# Patient Record
(demographics unavailable — no encounter records)

---

## 2025-02-05 NOTE — HISTORY OF PRESENT ILLNESS
[de-identified] : Nicolle Tom is a 60 year old female referred by Dr Wilder for colon cancer  Oncologic history: 1.  colon cancer (cecum) --presentation:  admitted to [facility] with severe iron deficiency anemia --EGD + colonoscopy (Dr Branham):  H pylori gastritis.  Malignant appearing mass at the ileocecal valve.  Biopsy: at least intramucosal carcinoma.  5 mm sigmoid polyp (pathology hyperplastic polyp). --pre-op CT c/a/p 24:  chest clear.  indeterminate liver lesions. cecal mass --pre-op MRI abdomen 24:  cysts in liver. --pre-op CEA = 2.2 --laparoscopic R colectomy 24.  Pathology:  grade 2 adenocarcinoma of the cecum.  + PNI.  No LVI, no perforation.  intermediate tumor budding score.  Margins negative.  0/19 lymph nodes involved by cancer.  MMR intact by IHC.  [id number] (stage IIA) --ctDNA negative.  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  SH: no drug use, nonsmoker.  lives in [place].  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  [de-identified] : Here for follow up. Up to 3 BM per day. Regular brown bowel movements, no hematochezia. 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 otherwise she feels good. denies SOB, fever, nausea, vomiting, constipation, diarrhea, or abdominal pain. Has FMLA paperwork

## 2025-02-05 NOTE — ASSESSMENT
[FreeTextEntry1] : Nicolle Tom is a 60 year old female who presented to [facility] in 4/2024 with severe iron deficiency anemia.  Underwent EGD + colonoscopy (Dr Branham) that showed H. pylori gastritis and a malignant appearing mass at the ileocecal valve.  Biopsy of the colon mass showed at least intramucosal carcinoma.  Pre-op CT c/a/p showed indeterminate liver lesions and the aforementioned cecal mass.  MRI of the abdomen was performed and reported that the lesions in the liver were cysts.  She underwent a laparoscopic R colectomy on 4/9/24.  Pathology showed a grade 2 adenocarcinoma of the cecum.  + PNI.  No LVI, no perforation.  intermediate tumor budding score.  Margins negative.  0/19 lymph nodes involved by cancer.  MMR intact by IHC.  [id number] (stage IIA).  Circulating tumor DNA testing negative post-op; no adjuvant therapy. Currently on surveillance.  Plan: #.  colon (cecum) cancer --diagnosis, imaging and pathology results reviewed w/ the patient and her family at the initial postoperative visit.  Prognosis was also discussed.  This is a potentially curable cancer. --She had a stage IIA colon cancer with good lymph node sampling.  Only adverse risk factor for recurrence was the presence of perineural invasion.  Since CT DNA testing was negative, we deferred adjuvant chemotherapy and she entered observation. --doing well at this visit, no concerning findings by history or examination --due for labs:  CBC, CMP, CEA today   --discussed surveillance:  colonoscopy at 1 year (4/2025);  CT scans annually (due Spring 2025);  H&P + labs every 3-6 months.  Will refer to GI for colonoscopy in Spring.  # REJI --Hgb 12.4 in July 2024, Iron sat 16% and ferritin 29 --Intermittently using PO iron, on daily MV  #  History of h pylori gastritis --after initial visit was able to confirm that she was treated for H pylori after this was diagnosed.  obtained H pylori breath test, negative.   RTC in April/May 2025 after scans, colonoscopy - CBC, CMP, CEA  please see attending phyisican addendum below

## 2025-02-05 NOTE — ASSESSMENT
[FreeTextEntry1] : Nicolle Tom is a 60 year old female who presented to [facility] in 4/2024 with severe iron deficiency anemia.  Underwent EGD + colonoscopy (Dr Branham) that showed H. pylori gastritis and a malignant appearing mass at the ileocecal valve.  Biopsy of the colon mass showed at least intramucosal carcinoma.  Pre-op CT c/a/p showed indeterminate liver lesions and the aforementioned cecal mass.  MRI of the abdomen was performed and reported that the lesions in the liver were cysts.  She underwent a laparoscopic R colectomy on 4/9/24.  Pathology showed a grade 2 adenocarcinoma of the cecum.  + PNI.  No LVI, no perforation.  intermediate tumor budding score.  Margins negative.  0/19 lymph nodes involved by cancer.  MMR intact by IHC.  [id number] (stage IIA).  Circulating tumor DNA testing negative post-op; no adjuvant therapy. Currently on surveillance.  Plan: #.  colon (cecum) cancer --diagnosis, imaging and pathology results reviewed w/ the patient and her family at the initial postoperative visit.  Prognosis was also discussed.  This is a potentially curable cancer. --She had a stage IIA colon cancer with good lymph node sampling.  Only adverse risk factor for recurrence was the presence of perineural invasion.  Since CT DNA testing was negative, we deferred adjuvant chemotherapy and she entered observation. --doing well at this visit, no concerning findings by history or examination --due for labs:  CBC, CMP, CEA today   --discussed surveillance:  colonoscopy at 1 year (4/2025);  CT scans annually (due Spring 2025);  H&P + labs every 3-6 months.  Will refer to GI for colonoscopy in Spring.  # REJI --Hgb 12.4 in July 2024, Iron sat 16% and ferritin 29 --Intermittently using PO iron, on daily MV  #  History of h pylori gastritis --after initial visit was able to confirm that she was treated for H pylori after this was diagnosed.  obtained H pylori breath test, negative.   RTC in April/May 2025 after scans, colonoscopy - CBC, CMP, CEA  please see attending phyisican addendum below

## 2025-02-05 NOTE — HISTORY OF PRESENT ILLNESS
[de-identified] : Nicolle Tom is a 60 year old female referred by Dr Wilder for colon cancer  Oncologic history: 1.  colon cancer (cecum) --presentation:  admitted to [facility] with severe iron deficiency anemia --EGD + colonoscopy (Dr Branham):  H pylori gastritis.  Malignant appearing mass at the ileocecal valve.  Biopsy: at least intramucosal carcinoma.  5 mm sigmoid polyp (pathology hyperplastic polyp). --pre-op CT c/a/p 24:  chest clear.  indeterminate liver lesions. cecal mass --pre-op MRI abdomen 24:  cysts in liver. --pre-op CEA = 2.2 --laparoscopic R colectomy 24.  Pathology:  grade 2 adenocarcinoma of the cecum.  + PNI.  No LVI, no perforation.  intermediate tumor budding score.  Margins negative.  0/19 lymph nodes involved by cancer.  MMR intact by IHC.  [id number] (stage IIA) --ctDNA negative.  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  SH: no drug use, nonsmoker.  lives in [place].  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  [de-identified] : Here for follow up. Up to 3 BM per day. Regular brown bowel movements, no hematochezia. 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 otherwise she feels good. denies SOB, fever, nausea, vomiting, constipation, diarrhea, or abdominal pain. Has FMLA paperwork

## 2025-02-05 NOTE — END OF VISIT
[] : Fellow [FreeTextEntry3] :  I evaluated the patient with the Oncology fellow, Dr Sanchez.  Ms Tom returns for follow up of stage II colon cancer.  She is doing well at this visit;  no concerning symptoms.  Back to work full time.  Plan; #  colon cancer --no concerns at this visit by history or exam for recurrence or metastasis --ordered CT c/a/p for Spring 2025 --refer to GI for colonoscopy in Spring 2025 --labs today - CBC, CMP, CEA  #  preventive care --maintain f/u with primary care physician for other age appropriate cancer screening and other health care measures

## 2025-02-05 NOTE — HISTORY OF PRESENT ILLNESS
[de-identified] : Nicolle Tom is a 60 year old female referred by Dr Wilder for colon cancer  Oncologic history: 1.  colon cancer (cecum) --presentation:  admitted to [facility] with severe iron deficiency anemia --EGD + colonoscopy (Dr Branham):  H pylori gastritis.  Malignant appearing mass at the ileocecal valve.  Biopsy: at least intramucosal carcinoma.  5 mm sigmoid polyp (pathology hyperplastic polyp). --pre-op CT c/a/p 24:  chest clear.  indeterminate liver lesions. cecal mass --pre-op MRI abdomen 24:  cysts in liver. --pre-op CEA = 2.2 --laparoscopic R colectomy 24.  Pathology:  grade 2 adenocarcinoma of the cecum.  + PNI.  No LVI, no perforation.  intermediate tumor budding score.  Margins negative.  0/19 lymph nodes involved by cancer.  MMR intact by IHC.  [id number] (stage IIA) --ctDNA negative.  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  SH: no drug use, nonsmoker.  lives in [place].  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  [de-identified] : Here for follow up. Up to 3 BM per day. Regular brown bowel movements, no hematochezia. 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 otherwise she feels good. denies SOB, fever, nausea, vomiting, constipation, diarrhea, or abdominal pain. Has FMLA paperwork

## 2025-02-05 NOTE — ASSESSMENT
[FreeTextEntry1] : Nicolle Tom is a 60 year old female who presented to [facility] in 4/2024 with severe iron deficiency anemia.  Underwent EGD + colonoscopy (Dr Branham) that showed H. pylori gastritis and a malignant appearing mass at the ileocecal valve.  Biopsy of the colon mass showed at least intramucosal carcinoma.  Pre-op CT c/a/p showed indeterminate liver lesions and the aforementioned cecal mass.  MRI of the abdomen was performed and reported that the lesions in the liver were cysts.  She underwent a laparoscopic R colectomy on 4/9/24.  Pathology showed a grade 2 adenocarcinoma of the cecum.  + PNI.  No LVI, no perforation.  intermediate tumor budding score.  Margins negative.  0/19 lymph nodes involved by cancer.  MMR intact by IHC.  [id number] (stage IIA).  Circulating tumor DNA testing negative post-op; no adjuvant therapy. Currently on surveillance.  Plan: #.  colon (cecum) cancer --diagnosis, imaging and pathology results reviewed w/ the patient and her family at the initial postoperative visit.  Prognosis was also discussed.  This is a potentially curable cancer. --She had a stage IIA colon cancer with good lymph node sampling.  Only adverse risk factor for recurrence was the presence of perineural invasion.  Since CT DNA testing was negative, we deferred adjuvant chemotherapy and she entered observation. --doing well at this visit, no concerning findings by history or examination --due for labs:  CBC, CMP, CEA today   --discussed surveillance:  colonoscopy at 1 year (4/2025);  CT scans annually (due Spring 2025);  H&P + labs every 3-6 months.  Will refer to GI for colonoscopy in Spring.  # REJI --Hgb 12.4 in July 2024, Iron sat 16% and ferritin 29 --Intermittently using PO iron, on daily MV  #  History of h pylori gastritis --after initial visit was able to confirm that she was treated for H pylori after this was diagnosed.  obtained H pylori breath test, negative.   RTC in April/May 2025 after scans, colonoscopy - CBC, CMP, CEA  please see attending phyisican addendum below

## 2025-03-21 NOTE — HISTORY OF PRESENT ILLNESS
[FreeTextEntry1] : 60F with a h/o HTN and cecal colon cancer s/p right colectomy (4/2024) who presents for colonoscopy consultation. Denies GI symptoms, including heartburn, n/v, abd pain, diarrhea, constipation, melena, or hematochezia.   Last EGD-colonoscopy approx 1 year ago with H pylori gastritis and malignant IC valve mass. Treated and confirmed eradication of HP (breath test 7/2024).   Family Hx: mother with colon cancer (age 69)  Social Hx: never smoker, no EtOH, no recreational drugs, works as a

## 2025-03-21 NOTE — ASSESSMENT
[FreeTextEntry1] : 60F with a h/o HTN and cecal colon cancer s/p right colectomy (4/2024) who presents for colonoscopy consultation.   #IC valve cancer s/p resection - schedule surveillance colonoscopy with suprep at [facility] vs [facility] - risks/benefits/alternatives and need for post-procedural escort discussed  Rafael Bautista MD Gastroenterology

## 2025-05-30 NOTE — ASSESSMENT
[FreeTextEntry1] : Nicolle Tom is a 60 year old female who presented to [facility] in 4/2024 with severe iron deficiency anemia.  Underwent EGD + colonoscopy (Dr Branham) that showed H. pylori gastritis and a malignant appearing mass at the ileocecal valve.  Biopsy of the colon mass showed at least intramucosal carcinoma.  Pre-op CT c/a/p showed indeterminate liver lesions and the aforementioned cecal mass.  MRI of the abdomen was performed and reported that the lesions in the liver were cysts.  She underwent a laparoscopic R colectomy on 4/9/24.  Pathology showed a grade 2 adenocarcinoma of the cecum.  + PNI.  No LVI, no perforation.  intermediate tumor budding score.  Margins negative.  0/19 lymph nodes involved by cancer.  MMR intact by IHC.  [id number] (stage IIA).  Circulating tumor DNA testing negative post-op; no adjuvant therapy. Currently on surveillance.  Plan: #.  colon (cecum) cancer --diagnosis, imaging and pathology results reviewed w/ the patient and her family at the initial postoperative visit.  Prognosis was also discussed.  This is a potentially curable cancer. --She had a stage IIA colon cancer with good lymph node sampling.  Only adverse risk factor for recurrence was the presence of perineural invasion.  Since CT DNA testing was negative, we deferred adjuvant chemotherapy and she entered observation. --doing well at this visit, no concerning findings by history or examination --due for labs:  CBC, CMP, CEA today   --discussed surveillance:  colonoscopy at 1 year (4/2025);  CT scans annually (due Spring 2025);  H&P + labs every 3-6 months.  Will refer to GI for colonoscopy in Spring.  # REJI --Hgb 12.4 in July 2024, Iron sat 16% and ferritin 29 --Intermittently using PO iron, on daily MV  #  History of h pylori gastritis --after initial visit was able to confirm that she was treated for H pylori after this was diagnosed.  obtained H pylori breath test, negative.   RTC in April/May 2025 after scans, colonoscopy - CBC, CMP, CEA  please see attending phyisican addendum below

## 2025-05-30 NOTE — HISTORY OF PRESENT ILLNESS
[de-identified] : Nicolle Tom is a 60 year old female referred by Dr Wilder for colon cancer  Oncologic history: 1.  colon cancer (cecum) --presentation:  admitted to [facility] with severe iron deficiency anemia --EGD + colonoscopy (Dr Branham):  H pylori gastritis.  Malignant appearing mass at the ileocecal valve.  Biopsy: at least intramucosal carcinoma.  5 mm sigmoid polyp (pathology hyperplastic polyp). --pre-op CT c/a/p 24:  chest clear.  indeterminate liver lesions. cecal mass --pre-op MRI abdomen 24:  cysts in liver. --pre-op CEA = 2.2 --laparoscopic R colectomy 24.  Pathology:  grade 2 adenocarcinoma of the cecum.  + PNI.  No LVI, no perforation.  intermediate tumor budding score.  Margins negative.  0/19 lymph nodes involved by cancer.  MMR intact by IHC.  [id number] (stage IIA) --ctDNA negative.  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  SH: no drug use, nonsmoker.  lives in [place].  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  [de-identified] : Here for follow up. Up to 3 BM per day. Regular brown bowel movements, no hematochezia. 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 otherwise she feels good. denies SOB, fever, nausea, vomiting, constipation, diarrhea, or abdominal pain. Has FMLA paperwork

## 2025-06-19 NOTE — PHYSICAL EXAM
[Fully active, able to carry on all pre-disease performance without restriction] : Status 0 - Fully active, able to carry on all pre-disease performance without restriction [Normal] : affect appropriate [de-identified] : supple [de-identified] : normal RR, breathing pattern [de-identified] : normal HR [de-identified] : no edema [de-identified] : not distended

## 2025-06-19 NOTE — HISTORY OF PRESENT ILLNESS
[de-identified] : Nicolle Tom is a 60 year old female referred by Dr Wilder for colon cancer  Oncologic history: 1.  colon cancer (cecum) --presentation:  admitted to [facility] with severe iron deficiency anemia --EGD + colonoscopy (Dr Branham):  H pylori gastritis.  Malignant appearing mass at the ileocecal valve.  Biopsy: at least intramucosal carcinoma.  5 mm sigmoid polyp (pathology hyperplastic polyp). --pre-op CT c/a/p 24:  chest clear.  indeterminate liver lesions. cecal mass --pre-op MRI abdomen 24:  cysts in liver. --pre-op CEA = 2.2 --laparoscopic R colectomy 24.  Pathology:  grade 2 adenocarcinoma of the cecum.  + PNI.  No LVI, no perforation.  intermediate tumor budding score.  Margins negative.  0/19 lymph nodes involved by cancer.  MMR intact by IHC.  [id number] (stage IIA) --ctDNA negative.  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  SH: no drug use, nonsmoker.  lives in [place].  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  [de-identified] : here for routine f/u appt.  no new issues or problems.  chronic L knee and low back pain.

## 2025-06-19 NOTE — PHYSICAL EXAM
[Fully active, able to carry on all pre-disease performance without restriction] : Status 0 - Fully active, able to carry on all pre-disease performance without restriction [Normal] : affect appropriate [de-identified] : supple [de-identified] : normal RR, breathing pattern [de-identified] : normal HR [de-identified] : no edema [de-identified] : not distended

## 2025-06-19 NOTE — ASSESSMENT
[FreeTextEntry1] : Nicolle Tom is a 60 year old female who presented to [facility] in 4/2024 with severe iron deficiency anemia.  Underwent EGD + colonoscopy (Dr Branham) that showed H. pylori gastritis and a malignant appearing mass at the ileocecal valve.  Biopsy of the colon mass showed at least intramucosal carcinoma.  Pre-op CT c/a/p showed indeterminate liver lesions and the aforementioned cecal mass.  MRI of the abdomen was performed and reported that the lesions in the liver were cysts.  She underwent a laparoscopic R colectomy on 4/9/24.  Pathology showed a grade 2 adenocarcinoma of the cecum.  + PNI.  No LVI, no perforation.  intermediate tumor budding score.  Margins negative.  0/19 lymph nodes involved by cancer.  MMR intact by IHC.  [id number] (stage IIA).  Circulating tumor DNA testing negative post-op; no adjuvant therapy.   Plan: #.  colon (cecum) cancer --diagnosis, imaging and pathology results reviewed w/ the patient and her family at the initial postoperative visit.  Prognosis was also discussed.  This is a potentially curable cancer. --She had a stage IIA colon cancer with good lymph node sampling.  Only adverse risk factor for recurrence was the presence of perineural invasion.  Since CT DNA testing was negative, we deferred adjuvant chemotherapy and she entered observation. --doing well at this visit, no concerning findings by history or examination --discussed results of CT c/a/p.  No findings for recurrence/metastasis --colonoscopy in 4/2025 with removal of a sub-centimeter hyperplastic polyp.  Cleared x 3 years. --CBC, CPM reassuring today.  CEA pending. --discussed the results of a recently reported phase III clinical trial from [place] on the benefits of a provider-directed structured exercise program for colon cancer survivors.  Study showed a significant survival benefit for a structured exercise program.  Pt encouraged to adopt an exercise routine.  She accepted a referral to PMR physician for consultation for this.  Emailed her resources as well.  #  History of h pylori gastritis --after initial visit was able to confirm that she was treated for H pylori after this was diagnosed.  obtained H pylori breath test, negative.  #  preventive care --PCP f/u encouraged for routine primary care --pt reports up to date w/ GYN for pap smear --she says last mammogram was "a few years ago."  gave her order for screening mammogram.  encouraged her to schedule.  She said she would take the order to place in [place] where last study was performed.  #  chronic L knee and low back pain --unrelated to her cancer.  PMR referral for this problem as above.   RTC 6 months CBC, CMP, CEA

## 2025-06-19 NOTE — ASSESSMENT
[FreeTextEntry1] : Nicolle Tom is a 60 year old female who presented to [facility] in 4/2024 with severe iron deficiency anemia.  Underwent EGD + colonoscopy (Dr Branham) that showed H. pylori gastritis and a malignant appearing mass at the ileocecal valve.  Biopsy of the colon mass showed at least intramucosal carcinoma.  Pre-op CT c/a/p showed indeterminate liver lesions and the aforementioned cecal mass.  MRI of the abdomen was performed and reported that the lesions in the liver were cysts.  She underwent a laparoscopic R colectomy on 4/9/24.  Pathology showed a grade 2 adenocarcinoma of the cecum.  + PNI.  No LVI, no perforation.  intermediate tumor budding score.  Margins negative.  0/19 lymph nodes involved by cancer.  MMR intact by IHC.  [id number] (stage IIA).  Circulating tumor DNA testing negative post-op; no adjuvant therapy.   Plan: #.  colon (cecum) cancer --diagnosis, imaging and pathology results reviewed w/ the patient and her family at the initial postoperative visit.  Prognosis was also discussed.  This is a potentially curable cancer. --She had a stage IIA colon cancer with good lymph node sampling.  Only adverse risk factor for recurrence was the presence of perineural invasion.  Since CT DNA testing was negative, we deferred adjuvant chemotherapy and she entered observation. --doing well at this visit, no concerning findings by history or examination --discussed results of CT c/a/p.  No findings for recurrence/metastasis --colonoscopy in 4/2025 with removal of a sub-centimeter hyperplastic polyp.  Cleared x 3 years. --CBC, CPM reassuring today.  CEA pending. --discussed the results of a recently reported phase III clinical trial from [place] on the benefits of a provider-directed structured exercise program for colon cancer survivors.  Study showed a significant survival benefit for a structured exercise program.  Pt encouraged to adopt an exercise routine.  She accepted a referral to PMR physician for consultation for this.  Emailed her resources as well.  #  History of h pylori gastritis --after initial visit was able to confirm that she was treated for H pylori after this was diagnosed.  obtained H pylori breath test, negative.  #  preventive care --PCP f/u encouraged for routine primary care --pt reports up to date w/ GYN for pap smear --she says last mammogram was "a few years ago."  gave her order for screening mammogram.  encouraged her to schedule.  She said she would take the order to place in [place] where last study was performed.  #  chronic L knee and low back pain --unrelated to her cancer.  PMR referral for this problem as above.   RTC 6 months CBC, CMP, CEA

## 2025-06-19 NOTE — HISTORY OF PRESENT ILLNESS
[de-identified] : Nicolle Tom is a 60 year old female referred by Dr Wilder for colon cancer  Oncologic history: 1.  colon cancer (cecum) --presentation:  admitted to [facility] with severe iron deficiency anemia --EGD + colonoscopy (Dr Branham):  H pylori gastritis.  Malignant appearing mass at the ileocecal valve.  Biopsy: at least intramucosal carcinoma.  5 mm sigmoid polyp (pathology hyperplastic polyp). --pre-op CT c/a/p 24:  chest clear.  indeterminate liver lesions. cecal mass --pre-op MRI abdomen 24:  cysts in liver. --pre-op CEA = 2.2 --laparoscopic R colectomy 24.  Pathology:  grade 2 adenocarcinoma of the cecum.  + PNI.  No LVI, no perforation.  intermediate tumor budding score.  Margins negative.  0/19 lymph nodes involved by cancer.  MMR intact by IHC.  [id number] (stage IIA) --ctDNA negative.  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  SH: no drug use, nonsmoker.  lives in [place].  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  [de-identified] : here for routine f/u appt.  no new issues or problems.  chronic L knee and low back pain.